# Patient Record
Sex: MALE | Race: WHITE
[De-identification: names, ages, dates, MRNs, and addresses within clinical notes are randomized per-mention and may not be internally consistent; named-entity substitution may affect disease eponyms.]

---

## 2021-01-07 ENCOUNTER — HOSPITAL ENCOUNTER (EMERGENCY)
Dept: HOSPITAL 53 - M ED | Age: 36
Discharge: HOME | End: 2021-01-07
Payer: COMMERCIAL

## 2021-01-07 VITALS — DIASTOLIC BLOOD PRESSURE: 78 MMHG | SYSTOLIC BLOOD PRESSURE: 125 MMHG

## 2021-01-07 VITALS — HEIGHT: 73 IN | WEIGHT: 237.88 LBS | BODY MASS INDEX: 31.53 KG/M2

## 2021-01-07 DIAGNOSIS — J98.11: ICD-10-CM

## 2021-01-07 DIAGNOSIS — F17.200: ICD-10-CM

## 2021-01-07 DIAGNOSIS — N23: ICD-10-CM

## 2021-01-07 DIAGNOSIS — N20.1: Primary | ICD-10-CM

## 2021-01-07 LAB
ALBUMIN SERPL BCG-MCNC: 4.1 GM/DL (ref 3.2–5.2)
ALT SERPL W P-5'-P-CCNC: 42 U/L (ref 12–78)
AMYLASE SERPL-CCNC: 33 U/L (ref 25–115)
BASOPHILS # BLD AUTO: 0.1 10^3/UL (ref 0–0.2)
BASOPHILS NFR BLD AUTO: 0.4 % (ref 0–1)
BILIRUB CONJ SERPL-MCNC: 0.1 MG/DL (ref 0–0.2)
BILIRUB SERPL-MCNC: 0.5 MG/DL (ref 0.2–1)
CHLAMYDIA DNA AMPLIFICATION: NEGATIVE
EOSINOPHIL # BLD AUTO: 0.3 10^3/UL (ref 0–0.5)
EOSINOPHIL NFR BLD AUTO: 1.7 % (ref 0–3)
HCT VFR BLD AUTO: 46.3 % (ref 42–52)
HGB BLD-MCNC: 14.8 G/DL (ref 13.5–17.5)
LIPASE SERPL-CCNC: 86 U/L (ref 73–393)
LYMPHOCYTES # BLD AUTO: 1.1 10^3/UL (ref 1.5–5)
LYMPHOCYTES NFR BLD AUTO: 7 % (ref 24–44)
MCH RBC QN AUTO: 28.1 PG (ref 27–33)
MCHC RBC AUTO-ENTMCNC: 32 G/DL (ref 32–36.5)
MCV RBC AUTO: 88 FL (ref 80–96)
MONOCYTES # BLD AUTO: 0.9 10^3/UL (ref 0–0.8)
MONOCYTES NFR BLD AUTO: 5.8 % (ref 0–5)
N GONORRHOEA RRNA SPEC QL NAA+PROBE: NEGATIVE
NEUTROPHILS # BLD AUTO: 13.2 10^3/UL (ref 1.5–8.5)
NEUTROPHILS NFR BLD AUTO: 84.4 % (ref 36–66)
PLATELET # BLD AUTO: 249 10^3/UL (ref 150–450)
PROT SERPL-MCNC: 7.2 GM/DL (ref 6.4–8.2)
RBC # BLD AUTO: 5.26 10^6/UL (ref 4.3–6.1)
WBC # BLD AUTO: 15.6 10^3/UL (ref 4–10)

## 2021-01-07 PROCEDURE — 74176 CT ABD & PELVIS W/O CONTRAST: CPT

## 2021-01-07 PROCEDURE — 99284 EMERGENCY DEPT VISIT MOD MDM: CPT

## 2021-01-07 PROCEDURE — 85025 COMPLETE CBC W/AUTO DIFF WBC: CPT

## 2021-01-07 PROCEDURE — 81001 URINALYSIS AUTO W/SCOPE: CPT

## 2021-01-07 PROCEDURE — 80047 BASIC METABLC PNL IONIZED CA: CPT

## 2021-01-07 PROCEDURE — 96375 TX/PRO/DX INJ NEW DRUG ADDON: CPT

## 2021-01-07 PROCEDURE — 83690 ASSAY OF LIPASE: CPT

## 2021-01-07 PROCEDURE — 82150 ASSAY OF AMYLASE: CPT

## 2021-01-07 PROCEDURE — 96374 THER/PROPH/DIAG INJ IV PUSH: CPT

## 2021-01-07 PROCEDURE — 80076 HEPATIC FUNCTION PANEL: CPT

## 2021-01-07 PROCEDURE — 87661 TRICHOMONAS VAGINALIS AMPLIF: CPT

## 2021-01-07 NOTE — REP
INDICATION:

L flank pain, dysuria, h/o kidney stones



COMPARISON:

None.



TECHNIQUE:

Helical scanning is acquired in 4 mm axial images were reformatted.  Coronal and

sagittal MPR images were generated and reviewed.



FINDINGS:

Digital preliminary  radiograph is unremarkable.  The lung bases show mild linear

plate-like atelectasis or fibrosis.  There is no evidence of pleural effusion or upper

abdominal ascites.  The liver and the spleen are normal in size homogeneous in

texture.  There is a small accessory splenule in the left upper quadrant.  No

abnormality is noted in the pancreas.  No abnormality is noted in the gallbladder.

Normal adrenal glands are seen.



The right kidney is unremarkable.  The left kidney shows moderate hydronephrosis and

hydroureter with [place] ureteral and perinephric stranding.  The hydroureter is seen to

the level of the ureterovesical junction where there is an obstructive calculus

measuring 4.3 mm at the ureterovesical junction.  No bladder calculus is seen.  No

intrarenal calculus is observed on either side.  Prostate, seminal vesicles and

urinary bladder are unremarkable.  A normal appendix is seen.  Small and large bowel

loops are normal in appearance.  No abdominal wall defect or bony destructive lesion

is appreciated.



IMPRESSION:

4.3 mm obstructive left distal ureteral calculus at the ureterovesical junction with

moderate hydronephrosis and hydroureter on the left side.  Bibasilar plate-like

atelectasis in the lung fields.





<Electronically signed by Mo Kevin > 01/07/21 [id number]

## 2021-01-15 ENCOUNTER — HOSPITAL ENCOUNTER (OUTPATIENT)
Dept: HOSPITAL 53 - M SMT | Age: 36
End: 2021-01-15
Attending: UROLOGY
Payer: COMMERCIAL

## 2021-01-15 DIAGNOSIS — N20.1: Primary | ICD-10-CM
